# Patient Record
Sex: FEMALE | Race: BLACK OR AFRICAN AMERICAN
[De-identification: names, ages, dates, MRNs, and addresses within clinical notes are randomized per-mention and may not be internally consistent; named-entity substitution may affect disease eponyms.]

---

## 2017-04-03 ENCOUNTER — HOSPITAL ENCOUNTER (EMERGENCY)
Dept: HOSPITAL 68 - ERH | Age: 22
End: 2017-04-03
Payer: COMMERCIAL

## 2017-04-03 VITALS — SYSTOLIC BLOOD PRESSURE: 135 MMHG | DIASTOLIC BLOOD PRESSURE: 84 MMHG

## 2017-04-03 DIAGNOSIS — J06.9: Primary | ICD-10-CM

## 2017-04-03 NOTE — ED INFLUENZA/URI COMPLAINT
History of Present Illness
 
General
Chief Complaint: General Adult
Stated Complaint: "I GOT A BAD COUGH"
Source: patient
Exam Limitations: no limitations
 
Vital Signs & Intake/Output
Vital Signs & Intake/Output
 Vital Signs
 
 
Date Time Temp Pulse Resp B/P Pulse O2 O2 Flow FiO2
 
     Ox Delivery Rate 
 
04/03 1940 97.8 92 18 135/84 98 Room Air  
 
04/03 1830 97.6 93  137/88 97   
 
 
Allergies
Coded Allergies:
NO KNOWN ALLERGIES (04/23/16)
 
Reconcile Medications
Insulin Glargine,Hum.rec.anlog (Lantus Solostar) (Unknown Strength) INSULN.PEN  
(Unknown Dose) SC BID DM  (Reported)
Liraglutide (Victoza 2-Tk) (Unknown Strength) PEN.INJCTR   (Unknown Dose) SC 
QAM DM  (Reported)
Medroxyprogesterone Acetate (Depo-Provera) 150 MG/ML SYRINGE   1 ML IM Q3M BIRTH
CONTROL  (Reported)
 
Triage Note:
PER PT COUGH AND SORE THROAT CHILLS RECENTLY HAD A
 COLD LAST WEEK. PER PT DOES NOT GET A PERIOD. D/T
 DEPO.UNABLE TO PRODUCE SPUTUM PAIN TO THROAT
Triage Nurses Notes Reviewed? yes
Onset: Gradual
Duration: constant
Timing: recent history
Severity: mild
Severity Numbers: 3
No Modifying Factors: none
Pregnant: No
Patient currently breastfeeds: No
HPI:
Patient is a 21-year-old female who presents emergency in approximately one week
ago she was complaining of a runny nose and then in the last 2-3 days she's been
complaining of nonproductive persistent cough sore throat and chills.  Patient 
has positive sick contacts.  Denies any chest pain shortness of breath 
hemoptysis nausea vomiting abdominal pain.
(ABEL SENA)
 
Past History
 
Travel History
Traveled to [place] past 21 day No
 
Medical History
Any Pertinent Medical History? see below for history
Neurological: NONE
EENT: NONE
Cardiovascular: NONE
Respiratory: NONE
Gastrointestinal: NONE
Hepatic: NONE
Renal: NONE
Musculoskeletal: NONE
Psychiatric: NONE
Endocrine: diabetes
Blood Disorders: NONE
Cancer(s): NONE
GYN/Reproductive: NONE
Other Medical Hx:
Obesity
 
Surgical History
Surgical History: non-contributory, N
 
Psychosocial History
What is your primary language English
Tobacco Use: Never used
 
Family History
Hx Contributory? No
(ABEL SENA)
 
Review of Systems
 
Review of Systems
Constitutional:
Reports: see HPI. 
EENTM:
Reports: see HPI. 
Respiratory:
Reports: see HPI, cough. 
Cardiovascular:
Reports: no symptoms. 
GI:
Reports: no symptoms. 
Genitourinary:
Reports: no symptoms. 
Musculoskeletal:
Reports: no symptoms. 
Skin:
Reports: no symptoms. 
Neurological/Psychological:
Reports: no symptoms. 
Hematologic/Endocrine:
Reports: no symptoms. 
Immunologic/Allergic:
Reports: no symptoms. 
All Other Systems: Reviewed and Negative
(ABEL SENA)
 
Physical Exam
 
Physical Exam
Ears, Nose, Throat: normal ENT inspection, moist mucous membrane, hearing 
grossly normal, Tympanic normal, pharynx normal
Comments:
Well-developed well-nourished person in no acute distress
HEENT: Normal EENT exam, extraocular motion intact, no nystagmus. Pupils equally
round and reactive to light and accommodation. Nose is atraumatic. External 
auditory canal and Tympanic membranes clear. Pharynx normal. No swelling or 
edema. 
Neck: Supple, no lymphadenopathy, normal range of motion without pain or 
tenderness
Back: Nontender, no CVA tenderness. 
Cardiovascular: Regular rate and rhythms no murmurs rubs or gallops, normal JVP
Respiratory: Chest nontender. No respiratory distress.breath sounds clear to 
auscultation bilaterally
Abdomen: Soft, nontender nondistended, no appreciable organomegaly. Normal bowel
sounds. No ascites
Extremity: No edema, no calf tenderness to palpation, normal and equal pulses.
Neuro: Alert oriented x3, motor sensory normal,
Skin: No appreciable rash on exposed skin, skin is warm and dry.
Psych: Mood and affect is normal, memory and judgment is normal.
 
Core Measures
Severe Sepsis Present: No
Septic Shock Present: No
(ABEL SENA)
 
Progress
Differential Diagnosis: influenza, meningitis, neutropenia, otitis, pneumonia, 
pharyngitis, sinusitis
Plan of Care:
 Orders
 
 
Procedure Date/time Status
 
THROAT CULTURE W/QUICK STREP 04/03 1832 Active
 
 
 Laboratory Tests
 
 
 
04/03/17 1905:
Urine Pregnancy Test Cancelled
Patient was in no apparent distress nontoxic-appearing afebrile clear lungs 
auscultation ENT exam was unremarkable.
(ABEL SENA)
Initial ED EKG: none
(ABEL SENA)
 
Departure
 
Departure
Disposition: HOME OR SELF CARE
Condition: Stable
Clinical Impression
Primary Impression: Upper respiratory infection
Referrals:
KETAN BAILEY,TERENCE HOWELL (PCP/Family)
 
Additional Instructions:
As discussed begin the prescription of azithromycin as directed for the full 
course, please begin the prescription of TESSALON PERLES for Cough and Medrol 
Dosepak for Inflammation,.  Prescriptions Waiting at [facility].  If Symptoms 
Worsen Return to Emergency.  If No Better in One Week Follow-Up with Her Primary
Care Doctor.
Departure Forms:
Customer Survey
General Discharge Information
(JUSTIN SCHAEFER,ABEL)
 
PA/NP Co-Sign Statement
Statement:
ED Attending supervision documentation-
 
[] I saw and evaluated the patient. I have also reviewed all the pertinent lab 
results and diagnostic results. I agree with the findings and the plan of care 
as documented in the PA's/NP's documentation. 
 
[X] I have reviewed the ED Record and agree with the PA's/NP's documentation.
 
[] Additions or exceptions (if any) to the PAs/NP's note and plan are 
summarized below:
[]
 
(TEA BAILEY,DIEGO MCLEOD)

## 2018-06-28 ENCOUNTER — HOSPITAL ENCOUNTER (EMERGENCY)
Dept: HOSPITAL 68 - ERH | Age: 23
End: 2018-06-28
Payer: COMMERCIAL

## 2018-06-28 VITALS — WEIGHT: 293 LBS | HEIGHT: 71 IN | BODY MASS INDEX: 41.02 KG/M2

## 2018-06-28 VITALS — DIASTOLIC BLOOD PRESSURE: 105 MMHG | SYSTOLIC BLOOD PRESSURE: 154 MMHG

## 2018-06-28 DIAGNOSIS — Z79.4: ICD-10-CM

## 2018-06-28 DIAGNOSIS — R35.0: ICD-10-CM

## 2018-06-28 DIAGNOSIS — E11.9: Primary | ICD-10-CM

## 2018-06-28 DIAGNOSIS — R51: ICD-10-CM

## 2018-06-28 LAB
ABSOLUTE GRANULOCYTE CT: 4.2 /CUMM (ref 1.4–6.5)
BASOPHILS # BLD: 0.1 /CUMM (ref 0–0.2)
BASOPHILS NFR BLD: 0.7 % (ref 0–2)
EOSINOPHIL # BLD: 0.1 /CUMM (ref 0–0.7)
EOSINOPHIL NFR BLD: 1.7 % (ref 0–5)
ERYTHROCYTE [DISTWIDTH] IN BLOOD BY AUTOMATED COUNT: 13.7 % (ref 11.5–14.5)
GRANULOCYTES NFR BLD: 50.6 % (ref 42.2–75.2)
HCT VFR BLD CALC: 36.4 % (ref 37–47)
LYMPHOCYTES # BLD: 3.2 /CUMM (ref 1.2–3.4)
MCH RBC QN AUTO: 27.5 PG (ref 27–31)
MCHC RBC AUTO-ENTMCNC: 32.1 G/DL (ref 33–37)
MCV RBC AUTO: 85.8 FL (ref 81–99)
MONOCYTES # BLD: 0.6 /CUMM (ref 0.1–0.6)
PLATELET # BLD: 310 /CUMM (ref 130–400)
PMV BLD AUTO: 9.5 FL (ref 7.4–10.4)
RED BLOOD CELL CT: 4.24 /CUMM (ref 4.2–5.4)
WBC # BLD AUTO: 8.3 /CUMM (ref 4.8–10.8)

## 2018-06-28 NOTE — ED GENERAL ADULT
History of Present Illness
 
General
Chief Complaint: General Adult
Stated Complaint: PT NEED TO GET RX FOR SUGAR
Source: patient
Exam Limitations: no limitations
 
Vital Signs & Intake/Output
Vital Signs & Intake/Output
 Vital Signs
 
 
Date Time Temp Pulse Resp B/P B/P Pulse O2 O2 Flow FiO2
 
     Mean Ox Delivery Rate 
 
06/28 1428 97.3 93 18 154/105  95 Room Air  
 
 
 
Allergies
Coded Allergies:
NO KNOWN ALLERGIES (04/23/16)
 
Reconcile Medications
Hydroxyzine Hydrochloride (Atarax) 50 MG TAB   1 TAB PO TID PRN ITCHING 
Insulin Glargine,Hum.rec.anlog (Lantus Solostar) (Unknown Strength) INSULN.PEN  
(Unknown Dose) SC BID DM  (Reported)
Liraglutide (Victoza 2-Tk) (Unknown Strength) PEN.INJCTR   (Unknown Dose) SC 
QAM DM  (Reported)
Medroxyprogesterone Acetate (Depo-Provera) 150 MG/ML SYRINGE   1 ML IM Q3M BIRTH
CONTROL  (Reported)
Metformin HCl (Metformin HCl ER) 500 MG TAB.ER.24   1 TAB PO BID diabetes
Prednisone 10 MG TABLET   1 DOSE PO ONCE DAILY RASH
     6 TABS X3 DAYS, 5 TAB 3 DAYS, 4 X3 DAYS, 3X3 DAYS, 2X3 DAYS 1 X 3 DAYS
 
Triage Note:
23 Y/O FEMALE REQUESTING PRESCRIPTIONS FOR VICTOZA
AND LANTUS.  STATES SHE HAS BEEN OUT OF MEDS "FOR
A WHILE" (>1 MONTH) DUE TO BEING ABLE TO GO TO
DOCTOR AFTER TURNING 22 YEARS OLD.  PT REPORTS
INCREASED DIAPHORESIS AND URINATION.  FINGERSTICK
IN TRIAGE 131
EVAL'D BY SILVANO CARRILLO.
Triage Nurses Notes Reviewed? yes
Onset: Gradual
Duration: week(s):
Timing: recent history
Severity: moderate
Pregnant: No
Patient currently breastfeeds: No
HPI:
22-year-old female with history of type 2 diabetes presents to emergency 
department requesting medication refills and referral to a new endocrinologist. 
Patient states that since she turned 22 she is not been able to follow up with 
her pediatric endocrinologist.  Patient states she's been out of her medications
for about one month or longer.  Patient was formerly taking Victoza, Lantus, 
metformin.  Patient states that fingerstick glucoses at home has been "high" 
however she does not remember the amounts.  Patient reports polydipsia, 
intermittent diaphoresis, urinary frequency, intermittent headaches.  Currently 
patient does not have a headache.
(Adelaide Robb)
 
Past History
 
Travel History
Traveled to [place] past 21 day No
 
Medical History
Any Pertinent Medical History? see below for history
Neurological: NONE
EENT: NONE
Cardiovascular: NONE
Respiratory: NONE
Gastrointestinal: NONE
Hepatic: NONE
Renal: NONE
Musculoskeletal: NONE
Psychiatric: NONE
Endocrine: diabetes
Blood Disorders: NONE
Cancer(s): NONE
GYN/Reproductive: NONE
Other Medical Hx:
Obesity
 
Surgical History
Surgical History: non-contributory, N
 
Psychosocial History
What is your primary language English
Tobacco Use: Never used
 
Family History
Hx Contributory? No
(Adelaide Robb)
 
Review of Systems
 
Review of Systems
Constitutional:
Reports: see HPI. 
EENTM:
Reports: no symptoms. 
Respiratory:
Reports: no symptoms. 
Cardiovascular:
Reports: no symptoms. 
GI:
Reports: no symptoms. 
Genitourinary:
Reports: see HPI. 
Musculoskeletal:
Reports: no symptoms. 
Skin:
Reports: no symptoms. 
Neurological/Psychological:
Reports: see HPI. 
Hematologic/Endocrine:
Reports: see HPI. 
Immunologic/Allergic:
Reports: no symptoms. 
All Other Systems: Reviewed and Negative
(Adelaide Robb)
 
Physical Exam
 
Physical Exam
General Appearance: well developed/nourished, no apparent distress, alert, awake
, obese
Head: atraumatic, normal appearance
Eyes:
Bilateral: normal appearance. 
Ears, Nose, Throat: hearing grossly normal
Neck: normal inspection, supple, full range of motion
Respiratory: normal breath sounds, no respiratory distress, lungs clear
Cardiovascular: regular rate/rhythm
Gastrointestinal: normal bowel sounds, soft, non-tender, no organomegaly
Back: normal inspection, normal range of motion
Extremities: normal inspection, normal range of motion
Neurologic/Psych: awake, alert, oriented x 3
Skin: intact, normal color, warm/dry
 
Core Measures
ACS in differential dx? No
CVA/TIA Diagnosis: No
Sepsis Present: No
Sepsis Focused Exam Completed? No
(Adelaied Robb)
 
Progress
Differential Diagnoses
I considered the following diagnoses in my evaluation of the patient: [Diabetes,
hyperglycemia, electrolyte abnormality, UTI]
 
Plan of Care:
 Orders
 
 
Procedure Date/time Status
 
URINALYSIS 06/28 1432 Complete
 
COMPREHENSIVE METABOLIC PANEL 06/28 1432 Complete
 
CBC WITHOUT DIFFERENTIAL 06/28 1432 Complete
 
 
 Laboratory Tests
 
 
06/28/18 1627:
Urinalysis MOD  H, Urine Color YEL, Urine Clarity CLDY  H, Urine pH 6.0, Ur 
Specific Gravity >= 1.030, Urine Protein NEG, Urine Ketones NEG, Urine Nitrite 
NEG, Urine Bilirubin NEG, Urine Urobilinogen 0.2, Ur Leukocyte Esterase NEG, Ur 
Microscopic SEDIMENT EXAMINED, Urine RBC RARE, Urine WBC 3-5  H, Ur Epithelial 
Cells MANY  H, Urine Bacteria MANY  H, Urine Mucus MANY  H, Urine Hemoglobin NEG
, Urine Glucose NEG
 
06/28/18 1447:
Anion Gap 12, Estimated GFR > 60, BUN/Creatinine Ratio 12.9, Glucose 139  H, 
Calcium 9.5, Total Bilirubin 0.6, AST 20, ALT 29, Alkaline Phosphatase 51, Total
Protein 7.8, Albumin 4.2, Globulin 3.6, Albumin/Globulin Ratio 1.2, CBC w Diff 
NO MAN DIFF REQ, RBC 4.24, MCV 85.8, MCH 27.5, MCHC 32.1  L, RDW 13.7, MPV 9.5, 
Gran % 50.6, Lymphocytes % 39.2, Monocytes % 7.8, Eosinophils % 1.7, Basophils %
0.7, Absolute Granulocytes 4.2, Absolute Lymphocytes 3.2, Absolute Monocytes 0.6
, Absolute Eosinophils 0.1, Absolute Basophils 0.1
 
Fingerstick glucose here in the emergency department is stable.  Patient's 
chemistry panel is stable here patient sitting in stretcher in no acute distress
, nontoxic appearing, vital signs are within normal limits.  Patient  has been 
off her diabetes medications for over one month now.  Despite being off her 
medications for blood sugar is reasonable here in the emergency department.  
Starting 3 medications at once without close follow-up could cause hypoglycemia 
or other complication.  Will initiate metformin 500 mg twice a day and have 
patient follow up with Dr. Kay for endocrinology.  She was instructed to call 
tomorrow to make an appointment for as soon as possible for a prescription for 
her medications.  The patient agrees with this plan of care.  Dr. Sharpe agrees 
with this plan.
Initial ED EKG: none
(Agnes SCHAEFER,Adelaide Velez)
 
Departure
 
Departure
Disposition: HOME OR SELF CARE
Condition: Stable
Clinical Impression
Primary Impression: Diabetes
Qualifiers:  Diabetes mellitus type: type 2 Diabetes mellitus long term insulin 
use: unspecified long term insulin use status Diabetes mellitus complication 
status: with unspecified complications Qualified Code: E11.8 - Type 2 diabetes 
mellitus with unspecified complications
Referrals:
Patient Has No Primary Care Dr (PCP/Family)
 
Additional Instructions:
Take metformin as prescribed for diabetes.  You were given a referral to a new 
endocrinologist to follow up with, call the office tomorrow to make an 
appointment for as soon as possible.  Return here to the emergency Department 
with any worsening symptoms or concerns.
 
Please note that there might be incidental findings in your evaluation that are 
unrelated to the current emergency department visit.  Please notify your primary
care doctor about this emergency department visit in order to obtain and review 
all of the testing performed so that these incidental findings can be monitored 
as needed.
 
If you had an x-ray performed, please understand that some fractures may not be 
seen on the initial set of x-rays.  If your symptoms persist you might need a 
repeat set of x-rays to check for such a fracture.
 
If you had a laceration evaluated, please understand that foreign bodies such as
glass or wood may not be visible to the naked eye or on plain x-rays.  If the 
wound becomes red, swollen, increasingly more painful or if there is any 
drainage from the wound, please have it reevaluated by a physician for the 
possibility of a retained foreign body.
 
If you're unable to follow up as outlined in the discharge instructions please 
return to the emergency department.
 
Thank you for choosing the [facility] Emergency Department for your care.
It was a pleasure to serve you today.
Departure Forms:
Customer Survey
General Discharge Information
Prescriptions:
Current Visit Scripts
Metformin HCl (Metformin HCl ER) 1 TAB PO BID  
     #20 TAB 
 
 
(Adelaide Robb)
 
PA/NP Co-Sign Statement
Statement:
ED Attending supervision documentation-
 
[] I saw and evaluated the patient. I have also reviewed all the pertinent lab 
results and diagnostic results. I agree with the findings and the plan of care 
as documented in the PA's/NP's documentation. 
 
[x] I have reviewed the ED Record and agree with the PA's/NP's documentation.
 
[] Additions or exceptions (if any) to the PAs/NP's note and plan are 
summarized below:
[]
 
(Guy Sharpe DO)
 
Critical Care Note
 
Critical Care Note
Critical Care Time: non-applicable
(Adelaide Robb)

## 2018-09-13 ENCOUNTER — HOSPITAL ENCOUNTER (EMERGENCY)
Dept: HOSPITAL 68 - ERH | Age: 23
Discharge: STILL ADMITTED OR ADMINISTRATIVE | End: 2018-09-13
Payer: COMMERCIAL

## 2018-09-13 VITALS — SYSTOLIC BLOOD PRESSURE: 154 MMHG | DIASTOLIC BLOOD PRESSURE: 87 MMHG

## 2018-09-13 DIAGNOSIS — M54.9: Primary | ICD-10-CM

## 2018-09-13 NOTE — ED NECK/BACK PAIN COMPLAINT
History of Present Illness
 
General
Chief Complaint: General Adult
Stated Complaint: "LOWER BACK PAIN, UNUSUAL MENSTATION"
Source: patient
Exam Limitations: no limitations
 
Vital Signs & Intake/Output
Vital Signs & Intake/Output
 Vital Signs
 
 
Date Time Temp Pulse Resp B/P B/P Pulse O2 O2 Flow FiO2
 
     Mean Ox Delivery Rate 
 
09/13 1943 97.4 98 20 154/87  97 Room Air  
 
 
 
Allergies
Coded Allergies:
NO KNOWN ALLERGIES (04/23/16)
 
Reconcile Medications
Hydroxyzine Hydrochloride (Atarax) 50 MG TAB   1 TAB PO TID PRN ITCHING 
Insulin Glargine,Hum.rec.anlog (Lantus Solostar) (Unknown Strength) INSULN.PEN  
(Unknown Dose) SC BID DM  (Reported)
Liraglutide (Victoza 2-Tk) (Unknown Strength) PEN.INJCTR   (Unknown Dose) SC 
QAM DM  (Reported)
Medroxyprogesterone Acetate (Depo-Provera) 150 MG/ML SYRINGE   1 ML IM Q3M BIRTH
CONTROL  (Reported)
Metformin HCl (Metformin HCl ER) 500 MG TAB.ER.24   1 TAB PO BID diabetes
Prednisone 10 MG TABLET   1 DOSE PO ONCE DAILY RASH
     6 TABS X3 DAYS, 5 TAB 3 DAYS, 4 X3 DAYS, 3X3 DAYS, 2X3 DAYS 1 X 3 DAYS
 
Triage Nurses Notes Reviewed? yes
Onset: Gradual
Duration: day(s):
Timing: recent history
Quality/Severity: moderate
Location: LEFT LOWER BACK
Pregnant: No
Patient currently breastfeeds: No
HPI:
22YO FEMALE with hx of DM presents to ED complaining of left sided back pain x 1
week. Patient states pain has gradually worsened. Pain currently 8/10, nagging. 
She googled back pain and thinks she may have a UTI. Patient reports dysuria, 
urinary frequency. Patient also notes vaginal bleeding which is red/brown, She 
states this is irregular for her because she is on the depo shot. The patient is
not sexually active at this time, no new sexual partners.
(Agnes SCHAEFER,Adelaide Velez)
 
Past History
 
Travel History
Traveled to [place] past 21 day No
 
Medical History
Any Pertinent Medical History? see below for history
Neurological: NONE
EENT: NONE
Cardiovascular: NONE
Respiratory: NONE
Gastrointestinal: NONE
Hepatic: NONE
Renal: NONE
Musculoskeletal: NONE
Psychiatric: NONE
Endocrine: diabetes
Blood Disorders: NONE
Cancer(s): NONE
GYN/Reproductive: NONE
Other Medical Hx:
Obesity
 
Surgical History
Surgical History: non-contributory, N
 
Psychosocial History
What is your primary language English
Tobacco Use: Never used
ETOH Use: denies use
 
Family History
Hx Contributory? No
(Adelaide Robb)
 
Review of Systems
 
Review of Systems
Constitutional:
Reports: no symptoms. 
Eyes:
Reports: no symptoms. 
Ears, Nose, Throat, Mouth:
Reports: no symptoms. 
Respiratory:
Reports: no symptoms. 
Cardiovascular:
Reports: no symptoms. 
Gastrointestinal/Abdominal:
Reports: see HPI. 
Musculoskeletal:
Reports: see HPI. 
Skin:
Reports: no symptoms. 
Neurological/Psychological:
Reports: no symptoms. 
All Other Systems: Reviewed and Negative
Comments
: SEE HPI
(Adelaide Robb)
 
Physical Exam
 
Physical Exam
General Appearance: well developed/nourished, no apparent distress, alert, awake
Head: atraumatic, normal appearance
Eyes:
Bilateral: normal appearance. 
Ears, Nose, Throat, Mouth: hearing grossly normal
Neck: normal inspection, supple, full range of motion
Respiratory: no respiratory distress
Back: LEFT LOWER BACK TENDERNESS, +LUMBAR TENDERNESS, NO CVA TENDERNESS
Extremities: normal range of motion
Neurologic/Psych: awake, alert, oriented x 3, normal gait
Skin: intact, normal color, warm/dry
Comments:
Full /complete exam was not performed as patient left prior to complete 
evaluation
 
Core Measures
CVA/TIA Diagnosis: No
(Adelaide Robb)
 
Progress
Differential Diagnosis: herniated disc, myofascial strain, pyelo/UTI, sciatica, 
spinal cord inj, T/L spine injury, ureterolithiasis
Plan of Care:
 Orders
 
 
Procedure Date/time Status
 
URINE PREGNANCY 09/13 1942 Active
 
URINALYSIS 09/13 1942 Active
 
 
I evaluated this patient in triage and did a brief physical exam however did not
completely examine this patient.  I recommended urinalysis as well as lumbar x-
rays that the patient agrees to.  Patient does report she has an appointment 
with her OB/GYN scheduled for a few weeks from now.
 
While waiting in the emergency department the patient left prior to obtaining 
urinalysis or x-rays.  She was not discharged.
(Adelaide Robb)
 
Departure
 
Departure
Disposition: ER WALKOUT
Condition: Stable
Clinical Impression
Primary Impression: Back pain
Referrals:
Patient Has No Primary Care Dr
 
Departure Forms:
Customer Survey
General Discharge Information
(Adelaide Robb)
 
PA/NP Co-Sign Statement
Statement:
ED Attending supervision documentation-
 
 I saw and evaluated the patient. I have also reviewed all the pertinent lab 
results and diagnostic results. I agree with the findings and the plan of care 
as documented in the PA's/NP's documentation. 
 
x I have reviewed the ED Record and agree with the PA's/NP's documentation.
 
[] Additions or exceptions (if any) to the PAs/NP's note and plan are 
summarized below:
[]
 
(Cindi BAILEY,Jason)

## 2018-09-14 ENCOUNTER — HOSPITAL ENCOUNTER (EMERGENCY)
Dept: HOSPITAL 68 - ERH | Age: 23
LOS: 1 days | End: 2018-09-15
Payer: COMMERCIAL

## 2018-09-14 VITALS — BODY MASS INDEX: 41.02 KG/M2 | HEIGHT: 71 IN | WEIGHT: 293 LBS

## 2018-09-14 DIAGNOSIS — M54.5: Primary | ICD-10-CM

## 2018-09-14 DIAGNOSIS — E11.9: ICD-10-CM

## 2018-09-14 DIAGNOSIS — Z79.84: ICD-10-CM

## 2018-09-14 LAB
ABSOLUTE GRANULOCYTE CT: 4.9 /CUMM (ref 1.4–6.5)
BASOPHILS # BLD: 0.1 /CUMM (ref 0–0.2)
BASOPHILS NFR BLD: 0.6 % (ref 0–2)
EOSINOPHIL # BLD: 0.1 /CUMM (ref 0–0.7)
EOSINOPHIL NFR BLD: 1.1 % (ref 0–5)
ERYTHROCYTE [DISTWIDTH] IN BLOOD BY AUTOMATED COUNT: 14.2 % (ref 11.5–14.5)
GRANULOCYTES NFR BLD: 46.9 % (ref 42.2–75.2)
HCT VFR BLD CALC: 38.6 % (ref 37–47)
LYMPHOCYTES # BLD: 4.6 /CUMM (ref 1.2–3.4)
MCH RBC QN AUTO: 27.2 PG (ref 27–31)
MCHC RBC AUTO-ENTMCNC: 32.3 G/DL (ref 33–37)
MCV RBC AUTO: 84.2 FL (ref 81–99)
MONOCYTES # BLD: 0.8 /CUMM (ref 0.1–0.6)
PLATELET # BLD: 305 /CUMM (ref 130–400)
PMV BLD AUTO: 9.9 FL (ref 7.4–10.4)
RED BLOOD CELL CT: 4.58 /CUMM (ref 4.2–5.4)
WBC # BLD AUTO: 10.5 /CUMM (ref 4.8–10.8)

## 2018-09-14 NOTE — ED NECK/BACK PAIN COMPLAINT
History of Present Illness
 
General
Chief Complaint: General Adult
Stated Complaint: "LOWER BACK PAIN RADIATES RT ABD AND DIFF.BREATHIN
Source: patient
Exam Limitations: no limitations
 
Vital Signs & Intake/Output
Vital Signs & Intake/Output
 Vital Signs
 
 
Date Time Temp Pulse Resp B/P B/P Pulse O2 O2 Flow FiO2
 
     Mean Ox Delivery Rate 
 
09/15 0024  82 18 132/80  96 Room Air  
 
09/15 0005       Room Air  
 
2022 97.9 87 16 135/88  95   
 
 
 ED Intake and Output
 
 
 09/15 0000  1200
 
Intake Total  
 
Output Total  
 
Balance  
 
   
 
Patient 320 lb 
 
Weight  
 
Weight Reported by Patient 
 
Measurement  
 
Method  
 
 
 
Allergies
Coded Allergies:
NO KNOWN ALLERGIES (16)
 
Reconcile Medications
Cyclobenzaprine HCl 5 MG TABLET   1 TAB PO TIDPRN PRN muscle strain
Hydroxyzine Hydrochloride (Atarax) 50 MG TAB   1 TAB PO TID PRN ITCHING 
Ibuprofen 800 MG TABLET   1 TAB PO TID PRN pain
Insulin Glargine,Hum.rec.anlog (Lantus Solostar) (Unknown Strength) INSULN.PEN  
(Unknown Dose) SC BID DM  (Reported)
Liraglutide (Victoza 2-Tk) (Unknown Strength) PEN.INJCTR   (Unknown Dose) SC 
QAM DM  (Reported)
Medroxyprogesterone Acetate (Depo-Provera) 150 MG/ML SYRINGE   1 ML IM Q3M BIRTH
CONTROL  (Reported)
Metformin HCl (Metformin HCl ER) 500 MG TAB.ER.24   1 TAB PO BID diabetes
Prednisone 10 MG TABLET   1 DOSE PO ONCE DAILY RASH
     6 TABS X3 DAYS, 5 TAB 3 DAYS, 4 X3 DAYS, 3X3 DAYS, 2X3 DAYS 1 X 3 DAYS
 
Triage Note:
pt to ed for low back pain radiating to R flank x2
weeks. came to ED yesterday "but the wait was too
long, i googled it and it said probably and
infection so i came back today" +dysuria and
"blood on tissue when i wipe" denies chance of
pregnancy "i've had the depo shot for years"
provided with urine sample cup in triage.
Triage Nurses Notes Reviewed? yes
Onset: Gradual
Duration: day(s):
Timing: recent history
Quality/Severity: moderate
Location: lumbar spine, paraspinous muscles
Pregnant: No
Patient currently breastfeeds: No
HPI:
23-year-old female with history of diabetes presents to emergency department 
complaining of left-sided lower back pain for the past several days.  Patient 
states pain is gradually worsened, she has tried over-the-counter Tylenol 
without relief of her symptoms.  Pain is worse with movement.  Patient also 
reports right upper/mid abdominal pain beginning today.  She reports todaY she 
noted dysuria.  Patient also reports that recently she has been having brown 
vaginal bleeding, she states is his regular for her since she is on depo shot.  
No new sexual partners.  No recent fall or back trauma, no fevers, vomiting, 
diarrhea.
(Adelaide Robb)
 
Past History
 
Travel History
Traveled to [place] past 21 day No
 
Medical History
Any Pertinent Medical History? see below for history
Neurological: NONE
EENT: NONE
Cardiovascular: NONE
Respiratory: NONE
Gastrointestinal: NONE
Hepatic: NONE
Renal: NONE
Musculoskeletal: NONE
Psychiatric: NONE
Endocrine: diabetes
Blood Disorders: NONE
Cancer(s): NONE
GYN/Reproductive: NONE
Other Medical Hx:
Obesity
 
Surgical History
Surgical History: non-contributory, N
 
Psychosocial History
What is your primary language English
Tobacco Use: Never used
 
Family History
Hx Contributory? No
(Adelaide Robb)
 
Review of Systems
 
Review of Systems
Constitutional:
Reports: no symptoms. 
Eyes:
Reports: no symptoms. 
Ears, Nose, Throat, Mouth:
Reports: no symptoms. 
Respiratory:
Reports: no symptoms. 
Cardiovascular:
Reports: no symptoms. 
Gastrointestinal/Abdominal:
Reports: see HPI. 
Musculoskeletal:
Reports: see HPI. 
Skin:
Reports: no symptoms. 
Neurological/Psychological:
Reports: no symptoms. 
All Other Systems: Reviewed and Negative
Comments
: SEE HPI
(Adelaide Robb)
 
Physical Exam
 
Physical Exam
General Appearance: well developed/nourished, no apparent distress, alert, awake
, obese
Head: atraumatic, normal appearance
Eyes:
Bilateral: normal appearance. 
Ears, Nose, Throat, Mouth: hearing grossly normal
Neck: normal inspection, supple, full range of motion
Respiratory: normal breath sounds, no respiratory distress, lungs clear
Cardiovascular: regular rate/rhythm
Gastrointestinal: normal bowel sounds, soft, no organomegaly, MILD diffuse 
tenderness, no gaurding
Back: normal inspection, normal range of motion, lumbar spine tenderness, no CVA
tenderness
Extremities: normal range of motion
Neurologic/Psych: awake, alert, oriented x 3, normal gait
Skin: intact, normal color, warm/dry
 
Core Measures
CVA/TIA Diagnosis: No
(Adelaide Robb)
 
Progress
Differential Diagnosis: herniated disc, myofascial strain, pyelo/UTI, sciatica, 
spinal cord inj, T/L spine injury, ureterolithiasis
Plan of Care:
 Orders
 
 
Procedure Date/time Status
 
COMPREHENSIVE METABOLIC PANEL 2023 Complete
 
CBC WITHOUT DIFFERENTIAL 2023 Complete
 
URINE PREGNANCY 2022 Complete
 
URINALYSIS 2022 Complete
 
 
 Laboratory Tests
 
 
 
18:
Urine Color PINK  H, Urine Clarity HAZY  H, Urine pH 6.0, Ur Specific Gravity 
1.020, Urine Protein TRACE  H, Urine Ketones NEG, Urine Nitrite NEG, Urine 
Bilirubin NEG, Urine Urobilinogen 0.2, Ur Leukocyte Esterase NEG, Ur Microscopic
SEDIMENT EXAMINED, Urine RBC PACKD  H, Urine WBC 3-5  H, Ur Epithelial Cells MOD
 H, Urine Bacteria MOD  H, Urine Hemoglobin LARGE  H, Urine Glucose >=1000  H, 
Urine Pregnancy Test NEGATIVE
 
18:
Anion Gap 11, Estimated GFR > 60, BUN/Creatinine Ratio 10.0, Glucose 281  H, 
Calcium 9.4, Total Bilirubin 0.4, AST 15, ALT 25, Alkaline Phosphatase 61, Total
Protein 7.7, Albumin 4.1, Globulin 3.6, Albumin/Globulin Ratio 1.1, CBC w Diff 
NO MAN DIFF REQ, RBC 4.58, MCV 84.2, MCH 27.2, MCHC 32.3  L, RDW 14.2, MPV 9.9, 
Gran % 46.9, Lymphocytes % 43.4, Monocytes % 8.0, Eosinophils % 1.1, Basophils %
0.6, Absolute Granulocytes 4.9, Absolute Lymphocytes 4.6  H, Absolute Monocytes 
0.8  H, Absolute Eosinophils 0.1, Absolute Basophils 0.1
 
Patient has blood in her urine however she is currently menstruating.  Low 
suspicion for acute nephrolithiasis based on her symptoms and clinical 
presentation.  Her back pain is located in the lower back area, not flank area. 
Patient is sitting in stretcher on her phone, no acute distress, she is 
comfortable. Will obtain xray images for further assessment.
 
Xrays are stable. Patient started on mobic and flexoril for her symptoms. She is
ambulatory without difficulty leaving the ED. The patient agrees with the plan 
of care. She will follow up with OB/GYN regarding vaginal bleeding.
Diagnostic Imaging:
Viewed by Me: Radiology Read.  Discussed w/RAD: Radiology Read. 
Radiology Impression: PATIENT: YINA RAMESH  MEDICAL RECORD NO: [id number] PRESENT 
AGE: 23  PATIENT ACCOUNT NO: [id number] : 96  LOCATION: [facility] ORDERING 
PHYSICIAN: Adelaide SCHAEFER     SERVICE DATE:  EXAM TYPE: RAD -
XRY-LUMBOSACRAL SPINE 4 VIEWS EXAMINATION: XR LUMBOSACRAL SPINE CLINICAL 
INFORMATION: Low back pain COMPARISON: CT scan abdomen pelvis 2014 
TECHNIQUE: AP. Lateral. Cone-down AP lateral lumbosacral junction view FINDINGS:
The vertebral bodies and posterior elements are normal. The disc spaces are 
preserved and the vertebral alignment is normal. The paraspinal soft tissues are
normal. IMPRESSION: Unremarkable examination. DICTATED BY: Kane Cardenas MD  DATE/
TIME DICTATED:09/15/18 / 0005 :RAD.LEONARD  DATE/TIME TRANSCRIBED:
09/15/18 / 0005 CONFIDENTIAL, DO NOT COPY WITHOUT APPROPRIATE AUTHORIZATION.  <
Electronically signed in Other Vendor System>                                   
                                                    SIGNED BY: Kane Cardenas MD 09
/15/18 0010
(Agnes SCHAEFER,Adelaide Velez)
 
Departure
 
Departure
Disposition: HOME OR SELF CARE
Condition: Stable
Clinical Impression
Primary Impression: Back pain
Referrals:
Unknown (PCP/Family)
 
Additional Instructions:
Follow-up with your OB/GYN regarding her vaginal bleeding.  Take ibuprofen 800 
mg 3 times a day for back pain.  Return if if worsening symptoms or concerns.
 
Please note that there might be incidental findings in your evaluation that are 
unrelated to the current emergency department visit.  Please notify your primary
care doctor about this emergency department visit in order to obtain and review 
all of the testing performed so that these incidental findings can be monitored 
as needed.
 
If you had an x-ray performed, please understand that some fractures may not be 
seen on the initial set of x-rays.  If your symptoms persist you might need a 
repeat set of x-rays to check for such a fracture.
 
If you had a laceration evaluated, please understand that foreign bodies such as
glass or wood may not be visible to the naked eye or on plain x-rays.  If the 
wound becomes red, swollen, increasingly more painful or if there is any 
drainage from the wound, please have it reevaluated by a physician for the 
possibility of a retained foreign body.
 
If you're unable to follow up as outlined in the discharge instructions please 
return to the emergency department.
 
Thank you for choosing the [facility] Emergency Department for your care.
It was a pleasure to serve you today.
Departure Forms:
Customer Survey
General Discharge Information
Prescriptions:
Current Visit Scripts
Ibuprofen 1 TAB PO TID PRN pain 
     #30 TAB 
 
Cyclobenzaprine HCl 1 TAB PO TIDPRN PRN muscle strain 
     #21 TAB 
 
 
(Agnes SCHAEFER,Adelaide Velez)
 
PA/NP Co-Sign Statement
Statement:
ED Attending supervision documentation-
 
[] I saw and evaluated the patient. I have also reviewed all the pertinent lab 
results and diagnostic results. I agree with the findings and the plan of care 
as documented in the PA's/NP's documentation. 
 
[x] I have reviewed the ED Record and agree with the PA's/NP's documentation.
 
[] Additions or exceptions (if any) to the PAs/NP's note and plan are 
summarized below:
[]
 
(Jordana BAILEY,Dimitrios BENAVIDES)

## 2018-09-15 VITALS — DIASTOLIC BLOOD PRESSURE: 80 MMHG | SYSTOLIC BLOOD PRESSURE: 132 MMHG

## 2018-09-15 NOTE — RADIOLOGY REPORT
EXAMINATION:
XR LUMBOSACRAL SPINE
 
CLINICAL INFORMATION:
Low back pain
 
COMPARISON:
CT scan abdomen pelvis February 21, 2014
 
TECHNIQUE:
AP. Lateral. Cone-down AP lateral lumbosacral junction view
 
FINDINGS:
The vertebral bodies and posterior elements are normal. The disc spaces are
preserved and the vertebral alignment is normal. The paraspinal soft tissues
are normal.
 
IMPRESSION:
Unremarkable examination.